# Patient Record
Sex: FEMALE | URBAN - METROPOLITAN AREA
[De-identification: names, ages, dates, MRNs, and addresses within clinical notes are randomized per-mention and may not be internally consistent; named-entity substitution may affect disease eponyms.]

---

## 2020-11-20 ENCOUNTER — NURSE TRIAGE (OUTPATIENT)
Dept: ADMINISTRATIVE | Facility: CLINIC | Age: 75
End: 2020-11-20

## 2020-11-21 ENCOUNTER — NURSE TRIAGE (OUTPATIENT)
Dept: ADMINISTRATIVE | Facility: CLINIC | Age: 75
End: 2020-11-21

## 2020-11-21 NOTE — TELEPHONE ENCOUNTER
Reason for Disposition   Health Information question, no triage required and triager able to answer question    Protocols used: INFORMATION ONLY CALL-A-AH    Mili's daughter calling on her behalf. She is currently admitted to hospital in [place]. She was asking which home health company is the best. Advised triage nurse is not familiar with services in her area. Reccommended she reach out to  for her mothers care and also look at online reviews of local companies to help her make a decision on which company to use when her mother is discharged.   Care Advice Given     No Care Advice given for this encounter.      Disposition     Information or Advice Only Call      Protocols (Most recently selected listed first)     Name Status    INFORMATION ONLY CALL-A- Used

## 2020-11-21 NOTE — TELEPHONE ENCOUNTER
Patient's daughter is calling because she is not happy with the care her mother is receiving in ICU. States that she is a pt of Dr. Gomez and wants her transferred. Advised that the best people to talk to are the physicians at the facility where her mother is and the patient's daughter states that they are holding her hostage. Advised that they cannot do that and that she can leave AMA. Seems as if that is not possible due to patient's condition. Advised I would send a message.     Reason for Disposition   Health Information question, no triage required and triager able to answer question    Protocols used: INFORMATION ONLY CALL - NO TRIAGE-A-